# Patient Record
Sex: MALE | Race: WHITE | URBAN - METROPOLITAN AREA
[De-identification: names, ages, dates, MRNs, and addresses within clinical notes are randomized per-mention and may not be internally consistent; named-entity substitution may affect disease eponyms.]

---

## 2023-07-06 ENCOUNTER — APPOINTMENT (RX ONLY)
Dept: URBAN - METROPOLITAN AREA CLINIC 10 | Facility: CLINIC | Age: 53
Setting detail: DERMATOLOGY
End: 2023-07-06

## 2023-07-06 DIAGNOSIS — D22 MELANOCYTIC NEVI: ICD-10-CM | Status: STABLE

## 2023-07-06 DIAGNOSIS — D485 NEOPLASM OF UNCERTAIN BEHAVIOR OF SKIN: ICD-10-CM

## 2023-07-06 DIAGNOSIS — D18.0 HEMANGIOMA: ICD-10-CM | Status: STABLE

## 2023-07-06 DIAGNOSIS — L57.8 OTHER SKIN CHANGES DUE TO CHRONIC EXPOSURE TO NONIONIZING RADIATION: ICD-10-CM | Status: STABLE

## 2023-07-06 DIAGNOSIS — L81.4 OTHER MELANIN HYPERPIGMENTATION: ICD-10-CM | Status: STABLE

## 2023-07-06 DIAGNOSIS — L82.1 OTHER SEBORRHEIC KERATOSIS: ICD-10-CM

## 2023-07-06 PROBLEM — D23.71 OTHER BENIGN NEOPLASM OF SKIN OF RIGHT LOWER LIMB, INCLUDING HIP: Status: ACTIVE | Noted: 2023-07-06

## 2023-07-06 PROBLEM — D22.62 MELANOCYTIC NEVI OF LEFT UPPER LIMB, INCLUDING SHOULDER: Status: ACTIVE | Noted: 2023-07-06

## 2023-07-06 PROBLEM — D18.01 HEMANGIOMA OF SKIN AND SUBCUTANEOUS TISSUE: Status: ACTIVE | Noted: 2023-07-06

## 2023-07-06 PROBLEM — D23.72 OTHER BENIGN NEOPLASM OF SKIN OF LEFT LOWER LIMB, INCLUDING HIP: Status: ACTIVE | Noted: 2023-07-06

## 2023-07-06 PROBLEM — D48.5 NEOPLASM OF UNCERTAIN BEHAVIOR OF SKIN: Status: ACTIVE | Noted: 2023-07-06

## 2023-07-06 PROCEDURE — ? FULL BODY SKIN EXAM

## 2023-07-06 PROCEDURE — ? TREATMENT REGIMEN

## 2023-07-06 PROCEDURE — ? COUNSELING

## 2023-07-06 PROCEDURE — 99203 OFFICE O/P NEW LOW 30 MIN: CPT | Mod: 25

## 2023-07-06 PROCEDURE — ? BIOPSY BY SHAVE METHOD

## 2023-07-06 PROCEDURE — 11102 TANGNTL BX SKIN SINGLE LES: CPT

## 2023-07-06 ASSESSMENT — LOCATION DETAILED DESCRIPTION DERM
LOCATION DETAILED: LEFT DISTAL DORSAL FOREARM
LOCATION DETAILED: RIGHT DISTAL DORSAL FOREARM
LOCATION DETAILED: LEFT SUPERIOR LATERAL MALAR CHEEK
LOCATION DETAILED: LEFT PROXIMAL DORSAL FOREARM
LOCATION DETAILED: RIGHT INFERIOR LATERAL MIDBACK

## 2023-07-06 ASSESSMENT — LOCATION ZONE DERM
LOCATION ZONE: FACE
LOCATION ZONE: TRUNK
LOCATION ZONE: ARM

## 2023-07-06 ASSESSMENT — LOCATION SIMPLE DESCRIPTION DERM
LOCATION SIMPLE: RIGHT LOWER BACK
LOCATION SIMPLE: LEFT CHEEK
LOCATION SIMPLE: RIGHT FOREARM
LOCATION SIMPLE: LEFT FOREARM

## 2023-07-06 NOTE — HPI: EVALUATION OF SKIN LESION(S)
What Type Of Note Output Would You Prefer (Optional)?: Standard Output
Hpi Title: Evaluation of Skin Lesions
Additional History: Tbse. Spot on back  and spots on face.

## 2024-03-19 ENCOUNTER — APPOINTMENT (RX ONLY)
Dept: URBAN - METROPOLITAN AREA CLINIC 293 | Facility: CLINIC | Age: 54
Setting detail: DERMATOLOGY
End: 2024-03-19

## 2024-03-19 DIAGNOSIS — L65.9 NONSCARRING HAIR LOSS, UNSPECIFIED: ICD-10-CM

## 2024-03-19 DIAGNOSIS — B07.8 OTHER VIRAL WARTS: ICD-10-CM

## 2024-03-19 PROCEDURE — 99212 OFFICE O/P EST SF 10 MIN: CPT | Mod: 25

## 2024-03-19 PROCEDURE — 11310 SHAVE SKIN LESION 0.5 CM/<: CPT

## 2024-03-19 PROCEDURE — ? COUNSELING

## 2024-03-19 PROCEDURE — ? ADDITIONAL NOTES

## 2024-03-19 PROCEDURE — ? SHAVE REMOVAL

## 2024-03-19 ASSESSMENT — LOCATION DETAILED DESCRIPTION DERM
LOCATION DETAILED: RIGHT NARIS
LOCATION DETAILED: RIGHT NASAL ALA
LOCATION DETAILED: SUPERIOR MID FOREHEAD

## 2024-03-19 ASSESSMENT — LOCATION SIMPLE DESCRIPTION DERM
LOCATION SIMPLE: RIGHT NOSE
LOCATION SIMPLE: SUPERIOR FOREHEAD

## 2024-03-19 ASSESSMENT — LOCATION ZONE DERM
LOCATION ZONE: NOSE
LOCATION ZONE: FACE

## 2024-03-19 NOTE — PROCEDURE: ADDITIONAL NOTES
Detail Level: Simple
Render Risk Assessment In Note?: no
Additional Notes: recommended revian red and low dose oral manoxidil

## 2024-04-01 ENCOUNTER — RX ONLY (OUTPATIENT)
Age: 54
Setting detail: RX ONLY
End: 2024-04-01

## 2024-04-01 RX ORDER — MINOXIDIL 2.5 MG/1
TABLET ORAL
Qty: 15 | Refills: 3 | Status: ERX | COMMUNITY
Start: 2024-04-01

## 2024-08-06 ENCOUNTER — RX ONLY (OUTPATIENT)
Age: 54
Setting detail: RX ONLY
End: 2024-08-06

## 2024-08-06 RX ORDER — MINOXIDIL 2.5 MG/1
TABLET ORAL
Qty: 15 | Refills: 3 | Status: ERX